# Patient Record
Sex: FEMALE | Race: BLACK OR AFRICAN AMERICAN | NOT HISPANIC OR LATINO | URBAN - METROPOLITAN AREA
[De-identification: names, ages, dates, MRNs, and addresses within clinical notes are randomized per-mention and may not be internally consistent; named-entity substitution may affect disease eponyms.]

---

## 2020-06-01 ENCOUNTER — OFFICE VISIT (OUTPATIENT)
Dept: URBAN - METROPOLITAN AREA TELEHEALTH 2 | Facility: TELEHEALTH | Age: 80
End: 2020-06-01
Payer: COMMERCIAL

## 2020-06-01 DIAGNOSIS — K62.5 RECTAL BLEEDING: ICD-10-CM

## 2020-06-01 DIAGNOSIS — K76.89 HEPATIC CYST: ICD-10-CM

## 2020-06-01 DIAGNOSIS — R10.13 EPIGASTRIC ABDOMINAL PAIN: ICD-10-CM

## 2020-06-01 PROCEDURE — 99442 PHONE E/M BY PHYS 11-20 MIN: CPT | Performed by: INTERNAL MEDICINE

## 2020-06-01 RX ORDER — ONDANSETRON HCL 8 MG
TAKE 1 TABLET BY ORAL ROUTE 3 TIMES A DAY FOR 30 DAYS TABLET ORAL
Qty: 90 | Refills: 3 | Status: ACTIVE | COMMUNITY
Start: 2020-04-27 | End: 2020-08-25

## 2020-06-01 RX ORDER — SUCRALFATE 1 G/1
TAKE 1 TABLET(1 GRAM) BY MOUTH FOUR TIMES DAILY 1 HOUR BEFORE MEALS AND AT BEDTIME ON AN EMPTY STOMACH TABLET ORAL
Qty: 360 | Refills: 3 | Status: ACTIVE | COMMUNITY
Start: 2020-04-28 | End: 1900-01-01

## 2020-06-01 RX ORDER — OMEPRAZOLE 40 MG/1
TAKE 1 CAPSULE (40 MG) BY ORAL ROUTE 2 TIMES PER DAY BEFORE A MEAL FOR 90 DAYS CAPSULE, DELAYED RELEASE PELLETS ORAL 2
Qty: 180 | Refills: 3 | Status: ACTIVE | COMMUNITY
Start: 2020-04-27 | End: 2021-04-22

## 2020-06-01 RX ORDER — HYDROCORTISONE 25 MG/G
APPLY A THIN LAYER TO THE AFFECTED AREA(S) BY TOPICAL ROUTE 2 TIMES PER DAY FOR 14 DAYS CREAM TOPICAL 2
Qty: 2 | Refills: 1 | Status: ACTIVE | COMMUNITY
Start: 2020-05-07 | End: 2020-06-04

## 2020-06-01 RX ORDER — TRAVOPROST 0.04 MG/ML
SOLUTION/ DROPS OPHTHALMIC
Qty: 0 | Refills: 0 | Status: ACTIVE | COMMUNITY
Start: 1900-01-01 | End: 1900-01-01

## 2020-06-01 NOTE — HPI-TODAY'S VISIT:
Ms. Kinsey returns for follow-up of post-prandial abdominal pain and nausea.  Telephone call is used in lieu of video chat as patient does not have access to this technology.  Since her last clinic visit, she underwent EGD and colonoscopy.  EGD showed no H. pylori but some gastritis.  Colonoscopy with biopsies was normal as well.  She has been on BID PPI and sucralfate.  She is feeling well now.  No further nausea or pain.  She did not get her CT scan as she has been feeling better.  We also follow hepatic cysts which have been stable since 2009.  She is without complaints today.

## 2020-12-02 ENCOUNTER — OFFICE VISIT (OUTPATIENT)
Dept: URBAN - NONMETROPOLITAN AREA CLINIC 2 | Facility: CLINIC | Age: 80
End: 2020-12-02

## 2021-04-01 ENCOUNTER — OFFICE VISIT (OUTPATIENT)
Dept: URBAN - NONMETROPOLITAN AREA CLINIC 2 | Facility: CLINIC | Age: 81
End: 2021-04-01

## 2021-04-15 ENCOUNTER — TELEPHONE ENCOUNTER (OUTPATIENT)
Dept: URBAN - METROPOLITAN AREA CLINIC 98 | Facility: CLINIC | Age: 81
End: 2021-04-15

## 2021-04-15 ENCOUNTER — OFFICE VISIT (OUTPATIENT)
Dept: URBAN - NONMETROPOLITAN AREA CLINIC 2 | Facility: CLINIC | Age: 81
End: 2021-04-15
Payer: COMMERCIAL

## 2021-04-15 DIAGNOSIS — Z86.19 HISTORY OF HELICOBACTER INFECTION: ICD-10-CM

## 2021-04-15 DIAGNOSIS — R93.2 ABNORMAL CT SCAN, GALLBLADDER: ICD-10-CM

## 2021-04-15 DIAGNOSIS — Z80.0 FAMILY HISTORY OF COLON CANCER: ICD-10-CM

## 2021-04-15 DIAGNOSIS — K31.89 INTESTINAL METAPLASIA OF GASTRIC MUCOSA: ICD-10-CM

## 2021-04-15 DIAGNOSIS — Z86.010 HISTORY OF ADENOMATOUS POLYP OF COLON: ICD-10-CM

## 2021-04-15 DIAGNOSIS — K57.90 DIVERTICULOSIS: ICD-10-CM

## 2021-04-15 DIAGNOSIS — K76.89 HEPATIC CYST: ICD-10-CM

## 2021-04-15 DIAGNOSIS — K21.9 GASTROESOPHAGEAL REFLUX DISEASE, UNSPECIFIED WHETHER ESOPHAGITIS PRESENT: ICD-10-CM

## 2021-04-15 PROCEDURE — 99214 OFFICE O/P EST MOD 30 MIN: CPT | Performed by: INTERNAL MEDICINE

## 2021-04-15 RX ORDER — TRAVOPROST 0.04 MG/ML
SOLUTION/ DROPS OPHTHALMIC
Qty: 0 | Refills: 0 | Status: ACTIVE | COMMUNITY
Start: 1900-01-01

## 2021-04-15 RX ORDER — OMEPRAZOLE 20 MG/1
1 CAPSULE 30 MINUTES BEFORE MORNING MEAL CAPSULE, DELAYED RELEASE ORAL ONCE A DAY
Qty: 90 | Refills: 3 | OUTPATIENT
Start: 2021-04-19

## 2021-04-15 RX ORDER — HYDROCORTISONE 25 MG/G
1 APPLICATION CREAM TOPICAL TWICE A DAY
Qty: 1 | Refills: 1 | OUTPATIENT
Start: 2021-04-19 | End: 2021-06-18

## 2021-04-15 RX ORDER — SUCRALFATE 1 G/1
TAKE 1 TABLET(1 GRAM) BY MOUTH FOUR TIMES DAILY 1 HOUR BEFORE MEALS AND AT BEDTIME ON AN EMPTY STOMACH TABLET ORAL
Qty: 360 | Refills: 3 | Status: DISCONTINUED | COMMUNITY
Start: 2020-04-28

## 2021-04-15 RX ORDER — OMEPRAZOLE 40 MG/1
TAKE 1 CAPSULE (40 MG) BY ORAL ROUTE 2 TIMES PER DAY BEFORE A MEAL FOR 90 DAYS CAPSULE, DELAYED RELEASE PELLETS ORAL 2
Qty: 180 | Refills: 3 | Status: ACTIVE | COMMUNITY
Start: 2020-04-27 | End: 2021-04-22

## 2021-04-15 NOTE — HPI-TODAY'S VISIT:
79 yo F retired from  for a [employer]  3 kids. 2 local. former pt of dr michele last ov 4/27/2020 w dr person. colby f/u w a female MD om omeprazole 40mg qd w good control of gerd having 2-3 bms/d

## 2021-04-15 NOTE — PHYSICAL EXAM GASTROINTESTINAL
Abdomen, soft, nontender, nondistended, no guarding or rigidity, no masses palpable, normal bowel sounds

## 2021-04-22 LAB
ANTIPARIETAL CELL ANTIBODY: 28.7
FERRITIN, SERUM: 39
INTRINSIC FACTOR ABS, SERUM: 0.9
IRON BIND.CAP.(TIBC): 333
IRON SATURATION: 14
IRON: 46
MAGNESIUM: 2.1
UIBC: 287
VITAMIN B12: 499

## 2021-09-08 ENCOUNTER — TELEPHONE ENCOUNTER (OUTPATIENT)
Dept: URBAN - NONMETROPOLITAN AREA CLINIC 2 | Facility: CLINIC | Age: 81
End: 2021-09-08

## 2021-09-08 RX ORDER — HYDROCORTISONE 2.5% 25 MG/G
1 APPLICATION CREAM TOPICAL TWICE A DAY
Qty: 1 | Refills: 1 | OUTPATIENT
Start: 2021-09-10 | End: 2021-11-08

## 2021-09-22 ENCOUNTER — TELEPHONE ENCOUNTER (OUTPATIENT)
Dept: URBAN - METROPOLITAN AREA CLINIC 92 | Facility: CLINIC | Age: 81
End: 2021-09-22

## 2021-09-22 ENCOUNTER — WEB ENCOUNTER (OUTPATIENT)
Dept: URBAN - NONMETROPOLITAN AREA CLINIC 2 | Facility: CLINIC | Age: 81
End: 2021-09-22

## 2021-09-22 ENCOUNTER — OFFICE VISIT (OUTPATIENT)
Dept: URBAN - NONMETROPOLITAN AREA CLINIC 2 | Facility: CLINIC | Age: 81
End: 2021-09-22
Payer: COMMERCIAL

## 2021-09-22 VITALS
HEIGHT: 67 IN | WEIGHT: 210 LBS | HEART RATE: 76 BPM | SYSTOLIC BLOOD PRESSURE: 162 MMHG | DIASTOLIC BLOOD PRESSURE: 77 MMHG | BODY MASS INDEX: 32.96 KG/M2

## 2021-09-22 DIAGNOSIS — K21.9 GASTROESOPHAGEAL REFLUX DISEASE, UNSPECIFIED WHETHER ESOPHAGITIS PRESENT: ICD-10-CM

## 2021-09-22 DIAGNOSIS — K64.9 HEMORRHOIDS, UNSPECIFIED HEMORRHOID TYPE: ICD-10-CM

## 2021-09-22 DIAGNOSIS — Z86.19 HISTORY OF HELICOBACTER INFECTION: ICD-10-CM

## 2021-09-22 DIAGNOSIS — K57.90 DIVERTICULOSIS: ICD-10-CM

## 2021-09-22 DIAGNOSIS — K80.20 GALLSTONES: ICD-10-CM

## 2021-09-22 DIAGNOSIS — K76.89 HEPATIC CYST: ICD-10-CM

## 2021-09-22 DIAGNOSIS — Z80.0 FAMILY HISTORY OF COLON CANCER: ICD-10-CM

## 2021-09-22 DIAGNOSIS — D51.0 PERNICIOUS ANEMIA: ICD-10-CM

## 2021-09-22 DIAGNOSIS — Z86.010 HISTORY OF ADENOMATOUS POLYP OF COLON: ICD-10-CM

## 2021-09-22 DIAGNOSIS — K31.89 INTESTINAL METAPLASIA OF GASTRIC MUCOSA: ICD-10-CM

## 2021-09-22 PROBLEM — 72519002: Status: ACTIVE | Noted: 2021-04-18

## 2021-09-22 PROBLEM — 312824007: Status: ACTIVE | Noted: 2021-04-18

## 2021-09-22 PROBLEM — 70153002: Status: ACTIVE | Noted: 2021-09-22

## 2021-09-22 PROBLEM — 119981000119101: Status: ACTIVE | Noted: 2021-04-18

## 2021-09-22 PROCEDURE — 99214 OFFICE O/P EST MOD 30 MIN: CPT | Performed by: INTERNAL MEDICINE

## 2021-09-22 RX ORDER — OMEPRAZOLE 20 MG/1
1 CAPSULE 30 MINUTES BEFORE MORNING MEAL CAPSULE, DELAYED RELEASE ORAL ONCE A DAY
Qty: 90 | Refills: 3 | OUTPATIENT
Start: 2021-09-22

## 2021-09-22 RX ORDER — HYDROCORTISONE 2.5% 25 MG/G
1 APPLICATION CREAM TOPICAL TWICE A DAY
Qty: 1 | Refills: 1 | Status: ACTIVE | COMMUNITY
Start: 2021-09-10 | End: 2021-11-08

## 2021-09-22 RX ORDER — HYDROCORTISONE 2.5% 25 MG/G
1 APPLICATION CREAM TOPICAL TWICE A DAY
Qty: 1 | Refills: 2 | OUTPATIENT
Start: 2021-09-22 | End: 2021-12-20

## 2021-09-22 RX ORDER — TRAVOPROST 0.04 MG/ML
SOLUTION/ DROPS OPHTHALMIC
Qty: 0 | Refills: 0 | Status: ACTIVE | COMMUNITY
Start: 1900-01-01

## 2021-09-22 RX ORDER — OMEPRAZOLE 20 MG/1
1 CAPSULE 30 MINUTES BEFORE MORNING MEAL CAPSULE, DELAYED RELEASE ORAL ONCE A DAY
Qty: 90 | Refills: 3 | Status: ACTIVE | COMMUNITY
Start: 2021-04-19

## 2021-09-22 NOTE — HPI-TODAY'S VISIT:
81 yo F retired from  for a [employer]  3 kids. 2 local. former pt of dr michele last ov 4/15/2021 was omeprazole 40mg qd w good control of gerd but, rarely feels gerd dec to 20mg and still wo c/os still having 2-3 bms/d does have probs w hemorrhoids occ likes the proctozone. occ bld. does not want to pursue RBL at this point labs done in response to the notation of gastric IM revealed prob PA. now on B12 inj qmo and oral qwk

## 2021-09-23 ENCOUNTER — OFFICE VISIT (OUTPATIENT)
Dept: URBAN - NONMETROPOLITAN AREA CLINIC 2 | Facility: CLINIC | Age: 81
End: 2021-09-23

## 2021-12-15 ENCOUNTER — TELEPHONE ENCOUNTER (OUTPATIENT)
Dept: URBAN - METROPOLITAN AREA CLINIC 92 | Facility: CLINIC | Age: 81
End: 2021-12-15

## 2021-12-15 RX ORDER — OMEPRAZOLE 20 MG/1
1 CAPSULE 30 MINUTES BEFORE MORNING MEAL CAPSULE, DELAYED RELEASE ORAL ONCE A DAY
Qty: 90 | Refills: 1 | OUTPATIENT
Start: 2021-12-15

## 2022-01-24 ENCOUNTER — TELEPHONE ENCOUNTER (OUTPATIENT)
Dept: URBAN - NONMETROPOLITAN AREA CLINIC 2 | Facility: CLINIC | Age: 82
End: 2022-01-24

## 2022-01-24 RX ORDER — OMEPRAZOLE 20 MG/1
1 CAPSULE 30 MINUTES BEFORE MORNING MEAL CAPSULE, DELAYED RELEASE ORAL ONCE A DAY
Qty: 90 | Refills: 1 | OUTPATIENT
Start: 2022-01-25

## 2022-02-04 ENCOUNTER — TELEPHONE ENCOUNTER (OUTPATIENT)
Dept: URBAN - NONMETROPOLITAN AREA CLINIC 2 | Facility: CLINIC | Age: 82
End: 2022-02-04

## 2022-02-04 RX ORDER — OMEPRAZOLE 20 MG/1
1 CAPSULE 30 MINUTES BEFORE MORNING MEAL CAPSULE, DELAYED RELEASE ORAL ONCE A DAY
Qty: 90 | Refills: 0
Start: 2021-12-15

## 2022-02-07 ENCOUNTER — TELEPHONE ENCOUNTER (OUTPATIENT)
Dept: URBAN - METROPOLITAN AREA CLINIC 92 | Facility: CLINIC | Age: 82
End: 2022-02-07

## 2022-02-07 RX ORDER — HYDROCORTISONE 2.5% 25 MG/G
1 APPLICATION CREAM TOPICAL TWICE A DAY
Qty: 1 | Refills: 3 | OUTPATIENT
Start: 2022-02-07 | End: 2022-06-07

## 2022-05-04 ENCOUNTER — OFFICE VISIT (OUTPATIENT)
Dept: URBAN - NONMETROPOLITAN AREA CLINIC 2 | Facility: CLINIC | Age: 82
End: 2022-05-04
Payer: COMMERCIAL

## 2022-05-04 ENCOUNTER — TELEPHONE ENCOUNTER (OUTPATIENT)
Dept: URBAN - METROPOLITAN AREA CLINIC 92 | Facility: CLINIC | Age: 82
End: 2022-05-04

## 2022-05-04 VITALS
WEIGHT: 222.5 LBS | DIASTOLIC BLOOD PRESSURE: 67 MMHG | BODY MASS INDEX: 34.92 KG/M2 | TEMPERATURE: 97 F | SYSTOLIC BLOOD PRESSURE: 141 MMHG | HEIGHT: 67 IN | HEART RATE: 80 BPM

## 2022-05-04 DIAGNOSIS — K31.89 INTESTINAL METAPLASIA OF GASTRIC MUCOSA: ICD-10-CM

## 2022-05-04 DIAGNOSIS — Z86.010 HISTORY OF ADENOMATOUS POLYP OF COLON: ICD-10-CM

## 2022-05-04 DIAGNOSIS — K64.9 HEMORRHOIDS, UNSPECIFIED HEMORRHOID TYPE: ICD-10-CM

## 2022-05-04 DIAGNOSIS — Z80.0 FAMILY HISTORY OF COLON CANCER: ICD-10-CM

## 2022-05-04 DIAGNOSIS — K21.9 GASTROESOPHAGEAL REFLUX DISEASE, UNSPECIFIED WHETHER ESOPHAGITIS PRESENT: ICD-10-CM

## 2022-05-04 DIAGNOSIS — K80.20 GALLSTONES: ICD-10-CM

## 2022-05-04 DIAGNOSIS — K76.89 HEPATIC CYST: ICD-10-CM

## 2022-05-04 DIAGNOSIS — K57.90 DIVERTICULOSIS: ICD-10-CM

## 2022-05-04 DIAGNOSIS — D51.0 PERNICIOUS ANEMIA: ICD-10-CM

## 2022-05-04 DIAGNOSIS — Z86.19 HISTORY OF HELICOBACTER INFECTION: ICD-10-CM

## 2022-05-04 PROBLEM — 397881000: Status: ACTIVE | Noted: 2021-04-18

## 2022-05-04 PROBLEM — 84027009: Status: ACTIVE | Noted: 2021-09-22

## 2022-05-04 PROBLEM — 429047008: Status: ACTIVE | Noted: 2021-04-18

## 2022-05-04 PROCEDURE — 99214 OFFICE O/P EST MOD 30 MIN: CPT | Performed by: INTERNAL MEDICINE

## 2022-05-04 RX ORDER — OMEPRAZOLE 20 MG/1
1 CAPSULE 30 MINUTES BEFORE MORNING MEAL CAPSULE, DELAYED RELEASE ORAL ONCE A DAY
Qty: 90 | Refills: 3 | OUTPATIENT
Start: 2022-05-04

## 2022-05-04 RX ORDER — OMEPRAZOLE 20 MG/1
1 CAPSULE 30 MINUTES BEFORE MORNING MEAL CAPSULE, DELAYED RELEASE ORAL ONCE A DAY
Qty: 90 | Refills: 3 | Status: ACTIVE | COMMUNITY
Start: 2021-04-19

## 2022-05-04 RX ORDER — TRAVOPROST 0.04 MG/ML
SOLUTION/ DROPS OPHTHALMIC
Qty: 0 | Refills: 0 | Status: ACTIVE | COMMUNITY
Start: 1900-01-01

## 2022-05-04 RX ORDER — HYDROCORTISONE 2.5% 25 MG/G
1 APPLICATION CREAM TOPICAL TWICE A DAY
Qty: 1 | Refills: 3 | Status: ACTIVE | COMMUNITY
Start: 2022-02-07 | End: 2022-06-07

## 2022-05-04 RX ORDER — HYDROCORTISONE 2.5% 25 MG/G
1 APPLICATION CREAM TOPICAL TWICE A DAY
Qty: 1 | Refills: 2 | OUTPATIENT
Start: 2022-05-04 | End: 2022-08-02

## 2022-05-04 NOTE — HPI-TODAY'S VISIT:
80 yo F retired from  for a [employer]  3 kids. 2 local. former pt of dr michele last ov 9/22/2021 was on omeprazole 40mg qd w good control of gerd but, rarely feels gerd. dec to 20mg and still wo c/os still having 2-3 bms/d does have probs w hemorrhoids occ. likes the proctozone. occ bld. still does not want to pursue RBL at this point labs done in response to the notation of gastric IM revealed prob PA. now on B12 inj qmo and oral qwk

## 2022-05-05 LAB
FERRITIN, SERUM: 39
IRON BIND.CAP.(TIBC): 308
IRON SATURATION: 13
IRON: 40
MAGNESIUM: 2.3
UIBC: 268
VITAMIN B12: 567

## 2022-06-12 ENCOUNTER — TELEPHONE ENCOUNTER (OUTPATIENT)
Dept: URBAN - METROPOLITAN AREA CLINIC 92 | Facility: CLINIC | Age: 82
End: 2022-06-12

## 2022-07-15 ENCOUNTER — OFFICE VISIT (OUTPATIENT)
Dept: URBAN - NONMETROPOLITAN AREA SURGERY CENTER 1 | Facility: SURGERY CENTER | Age: 82
End: 2022-07-15
Payer: COMMERCIAL

## 2022-07-15 DIAGNOSIS — K31.89 ACQUIRED DEFORMITY OF DUODENUM: ICD-10-CM

## 2022-07-15 DIAGNOSIS — K22.89 DILATATION OF ESOPHAGUS: ICD-10-CM

## 2022-07-15 PROCEDURE — 43239 EGD BIOPSY SINGLE/MULTIPLE: CPT | Performed by: INTERNAL MEDICINE

## 2022-07-15 PROCEDURE — G8907 PT DOC NO EVENTS ON DISCHARG: HCPCS | Performed by: INTERNAL MEDICINE

## 2022-07-15 RX ORDER — OMEPRAZOLE 20 MG/1
1 CAPSULE 30 MINUTES BEFORE MORNING MEAL CAPSULE, DELAYED RELEASE ORAL ONCE A DAY
Qty: 90 | Refills: 3 | Status: ACTIVE | COMMUNITY
Start: 2022-05-04

## 2022-07-15 RX ORDER — HYDROCORTISONE 2.5% 25 MG/G
1 APPLICATION CREAM TOPICAL TWICE A DAY
Qty: 1 | Refills: 2 | Status: ACTIVE | COMMUNITY
Start: 2022-05-04 | End: 2022-08-02

## 2022-07-15 RX ORDER — TRAVOPROST 0.04 MG/ML
SOLUTION/ DROPS OPHTHALMIC
Qty: 0 | Refills: 0 | Status: ACTIVE | COMMUNITY
Start: 1900-01-01

## 2022-07-15 RX ORDER — OMEPRAZOLE 20 MG/1
1 CAPSULE 30 MINUTES BEFORE MORNING MEAL CAPSULE, DELAYED RELEASE ORAL ONCE A DAY
Qty: 90 | Refills: 3 | Status: ACTIVE | COMMUNITY
Start: 2021-04-19

## 2022-08-03 ENCOUNTER — TELEPHONE ENCOUNTER (OUTPATIENT)
Dept: URBAN - METROPOLITAN AREA CLINIC 23 | Facility: CLINIC | Age: 82
End: 2022-08-03

## 2023-05-03 ENCOUNTER — OFFICE VISIT (OUTPATIENT)
Dept: URBAN - NONMETROPOLITAN AREA CLINIC 2 | Facility: CLINIC | Age: 83
End: 2023-05-03
Payer: COMMERCIAL

## 2023-05-03 VITALS
BODY MASS INDEX: 34.12 KG/M2 | SYSTOLIC BLOOD PRESSURE: 152 MMHG | HEIGHT: 67 IN | WEIGHT: 217.4 LBS | DIASTOLIC BLOOD PRESSURE: 77 MMHG | HEART RATE: 76 BPM

## 2023-05-03 DIAGNOSIS — K57.90 DIVERTICULOSIS: ICD-10-CM

## 2023-05-03 DIAGNOSIS — K31.89 INTESTINAL METAPLASIA OF GASTRIC MUCOSA: ICD-10-CM

## 2023-05-03 DIAGNOSIS — K80.20 GALLSTONES: ICD-10-CM

## 2023-05-03 DIAGNOSIS — N28.9 RENAL LESION: ICD-10-CM

## 2023-05-03 DIAGNOSIS — D51.0 PERNICIOUS ANEMIA: ICD-10-CM

## 2023-05-03 DIAGNOSIS — K21.9 GASTROESOPHAGEAL REFLUX DISEASE, UNSPECIFIED WHETHER ESOPHAGITIS PRESENT: ICD-10-CM

## 2023-05-03 DIAGNOSIS — Z86.010 HISTORY OF ADENOMATOUS POLYP OF COLON: ICD-10-CM

## 2023-05-03 DIAGNOSIS — K64.9 HEMORRHOIDS, UNSPECIFIED HEMORRHOID TYPE: ICD-10-CM

## 2023-05-03 DIAGNOSIS — K76.89 HEPATIC CYST: ICD-10-CM

## 2023-05-03 DIAGNOSIS — Z80.0 FAMILY HISTORY OF COLON CANCER: ICD-10-CM

## 2023-05-03 DIAGNOSIS — Z86.19 HISTORY OF HELICOBACTER INFECTION: ICD-10-CM

## 2023-05-03 PROCEDURE — 99214 OFFICE O/P EST MOD 30 MIN: CPT | Performed by: INTERNAL MEDICINE

## 2023-05-03 RX ORDER — TRAVOPROST 0.04 MG/ML
SOLUTION/ DROPS OPHTHALMIC
Qty: 0 | Refills: 0 | Status: ACTIVE | COMMUNITY
Start: 1900-01-01

## 2023-05-03 RX ORDER — FEXOFENADINE HYDROCHLORIDE 180 MG/1
1 TABLET SWALLOW WHOLE WITH WATER; DO NOT TAKE WITH FRUIT JUICES TABLET ORAL ONCE A DAY
Status: ACTIVE | COMMUNITY

## 2023-05-03 RX ORDER — LOSARTAN POTASSIUM 100 MG/1
1 TABLET TABLET ORAL ONCE A DAY
Status: ACTIVE | COMMUNITY

## 2023-05-03 RX ORDER — OMEPRAZOLE 20 MG/1
1 CAPSULE 30 MINUTES BEFORE MORNING MEAL CAPSULE, DELAYED RELEASE ORAL ONCE A DAY
Qty: 90 | Refills: 3 | Status: ACTIVE | COMMUNITY
Start: 2021-04-19

## 2023-05-03 RX ORDER — ROSUVASTATIN CALCIUM 5 MG/1
1 TABLET TABLET, FILM COATED ORAL ONCE A DAY
Status: ACTIVE | COMMUNITY

## 2023-05-03 NOTE — HPI-TODAY'S VISIT:
81 yo F retired from  for a [employer]  lives alone. 3 kids. 2 local. former pt of dr michele last ov 5/4/2022 was on omeprazole 40mg qd w good control of gerd. but, rarely feels gerd. dec to 20mg and still wo c/os still having 2-3 bms/d does have probs w hemorrhoids occ. likes the proctozone. occ bld. still does not want to pursue RBL at this point labs done in response to the notation of gastric IM revealed prob PA. now on B12 inj qmo and oral qwk

## 2023-05-07 ENCOUNTER — LAB OUTSIDE AN ENCOUNTER (OUTPATIENT)
Dept: URBAN - NONMETROPOLITAN AREA CLINIC 2 | Facility: CLINIC | Age: 83
End: 2023-05-07

## 2023-05-07 ENCOUNTER — TELEPHONE ENCOUNTER (OUTPATIENT)
Dept: URBAN - METROPOLITAN AREA CLINIC 35 | Facility: CLINIC | Age: 83
End: 2023-05-07

## 2023-05-07 PROBLEM — 79131000119100: Status: ACTIVE | Noted: 2022-06-12

## 2023-05-07 PROBLEM — 235595009: Status: ACTIVE | Noted: 2021-04-18

## 2023-05-07 RX ORDER — OMEPRAZOLE 20 MG/1
1 CAPSULE 30 MINUTES BEFORE MORNING MEAL CAPSULE, DELAYED RELEASE ORAL ONCE A DAY
Qty: 90 | Refills: 3 | OUTPATIENT
Start: 2023-05-07

## 2023-05-11 ENCOUNTER — TELEPHONE ENCOUNTER (OUTPATIENT)
Dept: URBAN - NONMETROPOLITAN AREA CLINIC 2 | Facility: CLINIC | Age: 83
End: 2023-05-11

## 2023-05-11 RX ORDER — OMEPRAZOLE 20 MG/1
1 CAPSULE 30 MINUTES BEFORE MORNING MEAL CAPSULE, DELAYED RELEASE ORAL ONCE A DAY
Qty: 90 | Refills: 3 | OUTPATIENT
Start: 2023-05-11

## 2023-06-02 ENCOUNTER — TELEPHONE ENCOUNTER (OUTPATIENT)
Dept: URBAN - NONMETROPOLITAN AREA CLINIC 2 | Facility: CLINIC | Age: 83
End: 2023-06-02

## 2023-08-16 ENCOUNTER — OFFICE VISIT (OUTPATIENT)
Dept: URBAN - NONMETROPOLITAN AREA CLINIC 2 | Facility: CLINIC | Age: 83
End: 2023-08-16
Payer: COMMERCIAL

## 2023-08-16 ENCOUNTER — TELEPHONE ENCOUNTER (OUTPATIENT)
Dept: URBAN - METROPOLITAN AREA CLINIC 96 | Facility: CLINIC | Age: 83
End: 2023-08-16

## 2023-08-16 VITALS
DIASTOLIC BLOOD PRESSURE: 67 MMHG | BODY MASS INDEX: 33.9 KG/M2 | SYSTOLIC BLOOD PRESSURE: 146 MMHG | WEIGHT: 216 LBS | HEIGHT: 67 IN | HEART RATE: 73 BPM | OXYGEN SATURATION: 97 %

## 2023-08-16 DIAGNOSIS — D50.9 MICROCYTIC ANEMIA: ICD-10-CM

## 2023-08-16 DIAGNOSIS — Z86.010 HISTORY OF ADENOMATOUS POLYP OF COLON: ICD-10-CM

## 2023-08-16 DIAGNOSIS — K76.89 HEPATIC CYST: ICD-10-CM

## 2023-08-16 DIAGNOSIS — K21.9 GASTROESOPHAGEAL REFLUX DISEASE, UNSPECIFIED WHETHER ESOPHAGITIS PRESENT: ICD-10-CM

## 2023-08-16 PROCEDURE — 99214 OFFICE O/P EST MOD 30 MIN: CPT | Performed by: INTERNAL MEDICINE

## 2023-08-16 RX ORDER — LOSARTAN POTASSIUM 100 MG/1
1 TABLET TABLET ORAL ONCE A DAY
Status: ACTIVE | COMMUNITY

## 2023-08-16 RX ORDER — HYDROCORTISONE 25 MG/G
1 APPLICATION CREAM TOPICAL TWICE A DAY
Qty: 1 | Refills: 2 | OUTPATIENT
Start: 2023-08-16 | End: 2023-11-13

## 2023-08-16 RX ORDER — METFORMIN HYDROCHLORIDE 500 MG/1
TABLET ORAL
Qty: 180 TABLET | Status: ACTIVE | COMMUNITY

## 2023-08-16 RX ORDER — FEXOFENADINE HYDROCHLORIDE 180 MG/1
1 TABLET SWALLOW WHOLE WITH WATER; DO NOT TAKE WITH FRUIT JUICES TABLET ORAL ONCE A DAY
Status: ACTIVE | COMMUNITY

## 2023-08-16 RX ORDER — OMEPRAZOLE 20 MG/1
1 CAPSULE 30 MINUTES BEFORE MORNING MEAL CAPSULE, DELAYED RELEASE ORAL ONCE A DAY
Qty: 90 | Refills: 3 | OUTPATIENT
Start: 2023-08-16

## 2023-08-16 RX ORDER — TRAVOPROST 0.04 MG/ML
SOLUTION/ DROPS OPHTHALMIC
Qty: 0 | Refills: 0 | Status: ACTIVE | COMMUNITY
Start: 1900-01-01

## 2023-08-16 RX ORDER — ROSUVASTATIN CALCIUM 5 MG/1
1 TABLET TABLET, FILM COATED ORAL ONCE A DAY
Status: ACTIVE | COMMUNITY

## 2023-08-16 RX ORDER — OMEPRAZOLE 20 MG/1
1 CAPSULE 30 MINUTES BEFORE MORNING MEAL CAPSULE, DELAYED RELEASE ORAL ONCE A DAY
Qty: 90 | Refills: 3 | Status: ACTIVE | COMMUNITY
Start: 2023-05-07

## 2023-08-16 NOTE — HPI-TODAY'S VISIT:
83 yo F retired from  for a [employer]  lives alone. 3 kids. 2 local. former pt of dr michele last ov 5/3/2023 was on omeprazole 40mg qd w good control of gerd. but, rarely feels gerd. dec to 20mg and still wo c/os still having 2-3 bms/d does have probs w hemorrhoids occ. likes the proctozone. occ bld. still does not want to pursue RBL at this point labs done in response to the notation of gastric IM revealed prob PA. now on B12 inj qmo and oral qwk had an US ordered at 5/3/2023 for a  hx of hepatic cyst which showed benign cysts and a 2.2 cm stone in the GB neck. referred to a GS. but, developed acute cholecystitis prompting a LC 6/1/2023. labs then also showed a microcytic anemia and ALT/AST 56/96.

## 2023-08-20 ENCOUNTER — DASHBOARD ENCOUNTERS (OUTPATIENT)
Age: 83
End: 2023-08-20

## 2023-08-20 PROBLEM — 234349007: Status: ACTIVE | Noted: 2023-08-20

## 2023-08-20 PROBLEM — 235919008: Status: ACTIVE | Noted: 2021-09-22

## 2023-08-20 PROBLEM — 85057007: Status: ACTIVE | Noted: 2021-04-18

## 2023-08-24 LAB
A/G RATIO: 1.3
ABSOLUTE BASOPHILS: 40
ABSOLUTE EOSINOPHILS: 92
ABSOLUTE LYMPHOCYTES: 1551
ABSOLUTE MONOCYTES: 455
ABSOLUTE NEUTROPHILS: 4462
ALBUMIN: 3.8
ALKALINE PHOSPHATASE: 82
ALT (SGPT): 6
AST (SGOT): 10
BASOPHILS: 0.6
BILIRUBIN, TOTAL: 0.4
BUN/CREATININE RATIO: (no result)
BUN: 8
CALCIUM: 9.1
CARBON DIOXIDE, TOTAL: 27
CHLORIDE: 104
CREATININE: 0.86
EGFR: 68
EOSINOPHILS: 1.4
FERRITIN, SERUM: 10
GLOBULIN, TOTAL: 3
GLUCOSE: 103
HEMATOCRIT: 35.1
HEMOGLOBIN: 11.4
IRON BIND.CAP.(TIBC): 373
IRON SATURATION: 14
IRON: 51
LYMPHOCYTES: 23.5
MCH: 25.9
MCHC: 32.5
MCV: 79.6
MONOCYTES: 6.9
MPV: 10.6
NEUTROPHILS: 67.6
PLATELET COUNT: 255
POTASSIUM: 4.2
PROTEIN, TOTAL: 6.8
RDW: 14.8
RED BLOOD CELL COUNT: 4.41
SODIUM: 142
WHITE BLOOD CELL COUNT: 6.6

## 2023-09-05 ENCOUNTER — TELEPHONE ENCOUNTER (OUTPATIENT)
Dept: URBAN - NONMETROPOLITAN AREA CLINIC 2 | Facility: CLINIC | Age: 83
End: 2023-09-05

## 2023-09-05 RX ORDER — FERROUS GLUCONATE 324 MG
1 TABLET WITH WATER OR JUICE BETWEEN MEALS TABLET ORAL ONCE A DAY
Qty: 90 TABLET | Refills: 0 | OUTPATIENT
Start: 2023-09-10

## 2023-09-10 PROBLEM — 87522002: Status: ACTIVE | Noted: 2023-09-10

## 2023-09-20 ENCOUNTER — P2P PATIENT RECORD (OUTPATIENT)
Age: 83
End: 2023-09-20

## 2023-10-31 LAB
ABSOLUTE BASOPHILS: 33
ABSOLUTE EOSINOPHILS: 119
ABSOLUTE LYMPHOCYTES: 1307
ABSOLUTE MONOCYTES: 462
ABSOLUTE NEUTROPHILS: 4679
BASOPHILS: 0.5
EOSINOPHILS: 1.8
FERRITIN, SERUM: 10
HEMATOCRIT: 36.1
HEMOGLOBIN: 11.1
IRON BIND.CAP.(TIBC): 363
IRON SATURATION: 14
IRON: 51
LYMPHOCYTES: 19.8
MCH: 23.8
MCHC: 30.7
MCV: 77.5
MONOCYTES: 7
MPV: 10.9
NEUTROPHILS: 70.9
PLATELET COUNT: 272
RDW: 16
RED BLOOD CELL COUNT: 4.66
WHITE BLOOD CELL COUNT: 6.6

## 2024-01-25 ENCOUNTER — P2P PATIENT RECORD (OUTPATIENT)
Age: 84
End: 2024-01-25

## 2024-05-01 ENCOUNTER — OFFICE VISIT (OUTPATIENT)
Dept: URBAN - NONMETROPOLITAN AREA CLINIC 2 | Facility: CLINIC | Age: 84
End: 2024-05-01

## 2024-05-14 ENCOUNTER — TELEPHONE ENCOUNTER (OUTPATIENT)
Dept: URBAN - NONMETROPOLITAN AREA CLINIC 2 | Facility: CLINIC | Age: 84
End: 2024-05-14

## 2024-05-14 RX ORDER — OMEPRAZOLE 20 MG/1
1 CAPSULE 30 MINUTES BEFORE MORNING MEAL CAPSULE, DELAYED RELEASE ORAL ONCE A DAY
Qty: 90 | Refills: 3
Start: 2023-08-16

## 2024-05-29 ENCOUNTER — CLAIMS CREATED FROM THE CLAIM WINDOW (OUTPATIENT)
Dept: URBAN - NONMETROPOLITAN AREA CLINIC 2 | Facility: CLINIC | Age: 84
End: 2024-05-29

## 2024-05-29 ENCOUNTER — TELEPHONE ENCOUNTER (OUTPATIENT)
Dept: URBAN - METROPOLITAN AREA CLINIC 96 | Facility: CLINIC | Age: 84
End: 2024-05-29

## 2024-05-29 ENCOUNTER — LAB OUTSIDE AN ENCOUNTER (OUTPATIENT)
Dept: URBAN - NONMETROPOLITAN AREA CLINIC 2 | Facility: CLINIC | Age: 84
End: 2024-05-29

## 2024-05-29 ENCOUNTER — OFFICE VISIT (OUTPATIENT)
Dept: URBAN - NONMETROPOLITAN AREA CLINIC 2 | Facility: CLINIC | Age: 84
End: 2024-05-29
Payer: COMMERCIAL

## 2024-05-29 VITALS
WEIGHT: 225 LBS | HEIGHT: 67 IN | BODY MASS INDEX: 35.31 KG/M2 | DIASTOLIC BLOOD PRESSURE: 71 MMHG | HEART RATE: 93 BPM | SYSTOLIC BLOOD PRESSURE: 151 MMHG

## 2024-05-29 DIAGNOSIS — Z86.19 HISTORY OF HELICOBACTER INFECTION: ICD-10-CM

## 2024-05-29 DIAGNOSIS — K64.9 HEMORRHOIDS, UNSPECIFIED HEMORRHOID TYPE: ICD-10-CM

## 2024-05-29 DIAGNOSIS — D50.9 IRON DEFICIENCY ANEMIA, UNSPECIFIED IRON DEFICIENCY ANEMIA TYPE: ICD-10-CM

## 2024-05-29 DIAGNOSIS — K80.20 GALLSTONES: ICD-10-CM

## 2024-05-29 DIAGNOSIS — D50.9 MICROCYTIC ANEMIA: ICD-10-CM

## 2024-05-29 DIAGNOSIS — K21.9 GASTROESOPHAGEAL REFLUX DISEASE, UNSPECIFIED WHETHER ESOPHAGITIS PRESENT: ICD-10-CM

## 2024-05-29 DIAGNOSIS — K31.89 INTESTINAL METAPLASIA OF GASTRIC MUCOSA: ICD-10-CM

## 2024-05-29 DIAGNOSIS — N28.9 RENAL LESION: ICD-10-CM

## 2024-05-29 DIAGNOSIS — K57.90 DIVERTICULOSIS: ICD-10-CM

## 2024-05-29 DIAGNOSIS — Z86.010 HISTORY OF ADENOMATOUS POLYP OF COLON: ICD-10-CM

## 2024-05-29 DIAGNOSIS — K76.89 HEPATIC CYST: ICD-10-CM

## 2024-05-29 DIAGNOSIS — D51.0 PERNICIOUS ANEMIA: ICD-10-CM

## 2024-05-29 DIAGNOSIS — Z80.0 FAMILY HISTORY OF COLON CANCER: ICD-10-CM

## 2024-05-29 PROCEDURE — 99214 OFFICE O/P EST MOD 30 MIN: CPT | Performed by: INTERNAL MEDICINE

## 2024-05-29 RX ORDER — FEXOFENADINE HYDROCHLORIDE 180 MG/1
1 TABLET SWALLOW WHOLE WITH WATER; DO NOT TAKE WITH FRUIT JUICES TABLET ORAL ONCE A DAY
Status: ACTIVE | COMMUNITY

## 2024-05-29 RX ORDER — LOSARTAN POTASSIUM 100 MG/1
TABLET, FILM COATED ORAL
Qty: 90 TABLET | Status: ACTIVE | COMMUNITY

## 2024-05-29 RX ORDER — TRAVOPROST 0.04 MG/ML
SOLUTION/ DROPS OPHTHALMIC
Qty: 0 | Refills: 0 | Status: ACTIVE | COMMUNITY
Start: 1900-01-01

## 2024-05-29 RX ORDER — HYDROCHLOROTHIAZIDE 25 MG/1
TAKE 1 TABLET BY MOUTH ONCE DAILY TABLET ORAL
Qty: 90 EACH | Refills: 2 | Status: ACTIVE | COMMUNITY

## 2024-05-29 RX ORDER — FEXOFENADINE HCL 180 MG/1
TAKE 1 TABLET BY MOUTH ONCE DAILY IN THE MORNING TABLET ORAL
Qty: 90 EACH | Refills: 0 | Status: ACTIVE | COMMUNITY

## 2024-05-29 RX ORDER — HYDROCORTISONE 25 MG/G
APPLY TOPICALLY TO AFFECTED AREA TWICE DAILY CREAM TOPICAL
Qty: 28 GRAM | Refills: 0 | Status: ACTIVE | COMMUNITY

## 2024-05-29 RX ORDER — AMLODIPINE BESYLATE 5 MG/1
TABLET ORAL
Qty: 90 TABLET | Status: ACTIVE | COMMUNITY

## 2024-05-29 RX ORDER — ERGOCALCIFEROL CAPSULES, 1.25 MG/1
TAKE 1 CAPSULE BY MOUTH ONCE A WEEK CAPSULE ORAL
Qty: 12 EACH | Refills: 0 | Status: ACTIVE | COMMUNITY

## 2024-05-29 RX ORDER — OMEPRAZOLE 20 MG/1
1 CAPSULE 30 MINUTES BEFORE MORNING MEAL CAPSULE, DELAYED RELEASE ORAL ONCE A DAY
Qty: 90 | Refills: 3 | OUTPATIENT
Start: 2024-05-29

## 2024-05-29 RX ORDER — FERROUS SULFATE TAB 325 MG (65 MG ELEMENTAL FE) 325 (65 FE) MG
TAKE 1 TABLET BY MOUTH ONCE DAILY MAY INCREASE TO THREE TIMES A DAY IF TOLERATED TAB ORAL
Qty: 90 EACH | Refills: 0 | Status: ACTIVE | COMMUNITY

## 2024-05-29 RX ORDER — METFORMIN HYDROCHLORIDE 500 MG/1
TABLET ORAL
Qty: 180 TABLET | Status: ACTIVE | COMMUNITY

## 2024-05-29 RX ORDER — OMEPRAZOLE 20 MG/1
CAPSULE, DELAYED RELEASE ORAL
Qty: 90 CAPSULE | Status: ACTIVE | COMMUNITY

## 2024-05-29 RX ORDER — ROSUVASTATIN CALCIUM 5 MG/1
TABLET, FILM COATED ORAL
Qty: 90 TABLET | Status: ACTIVE | COMMUNITY

## 2024-08-14 ENCOUNTER — OFFICE VISIT (OUTPATIENT)
Dept: URBAN - NONMETROPOLITAN AREA CLINIC 2 | Facility: CLINIC | Age: 84
End: 2024-08-14

## 2024-10-15 ENCOUNTER — P2P PATIENT RECORD (OUTPATIENT)
Age: 84
End: 2024-10-15

## 2024-10-30 ENCOUNTER — TELEPHONE ENCOUNTER (OUTPATIENT)
Dept: URBAN - NONMETROPOLITAN AREA CLINIC 2 | Facility: CLINIC | Age: 84
End: 2024-10-30

## 2024-11-06 ENCOUNTER — OFFICE VISIT (OUTPATIENT)
Dept: URBAN - NONMETROPOLITAN AREA CLINIC 2 | Facility: CLINIC | Age: 84
End: 2024-11-06
Payer: COMMERCIAL

## 2024-11-06 VITALS
WEIGHT: 226 LBS | HEART RATE: 81 BPM | BODY MASS INDEX: 35.47 KG/M2 | HEIGHT: 67 IN | DIASTOLIC BLOOD PRESSURE: 71 MMHG | OXYGEN SATURATION: 97 % | SYSTOLIC BLOOD PRESSURE: 151 MMHG

## 2024-11-06 DIAGNOSIS — K31.89 INTESTINAL METAPLASIA OF GASTRIC MUCOSA: ICD-10-CM

## 2024-11-06 DIAGNOSIS — K64.9 HEMORRHOIDS, UNSPECIFIED HEMORRHOID TYPE: ICD-10-CM

## 2024-11-06 DIAGNOSIS — Z80.0 FAMILY HISTORY OF COLON CANCER: ICD-10-CM

## 2024-11-06 DIAGNOSIS — K80.20 GALLSTONES: ICD-10-CM

## 2024-11-06 DIAGNOSIS — Z86.19 HISTORY OF HELICOBACTER INFECTION: ICD-10-CM

## 2024-11-06 DIAGNOSIS — Z09 ENCOUNTER FOR FOLLOW-UP: ICD-10-CM

## 2024-11-06 DIAGNOSIS — D50.9 IRON DEFICIENCY ANEMIA, UNSPECIFIED IRON DEFICIENCY ANEMIA TYPE: ICD-10-CM

## 2024-11-06 DIAGNOSIS — K57.30 ACQUIRED DIVERTICULOSIS OF COLON: ICD-10-CM

## 2024-11-06 DIAGNOSIS — K76.89 HEPATIC CYST: ICD-10-CM

## 2024-11-06 DIAGNOSIS — K21.9 GASTROESOPHAGEAL REFLUX DISEASE, UNSPECIFIED WHETHER ESOPHAGITIS PRESENT: ICD-10-CM

## 2024-11-06 DIAGNOSIS — N28.9 RENAL LESION: ICD-10-CM

## 2024-11-06 DIAGNOSIS — Z86.0100 HISTORY OF COLON POLYPS: ICD-10-CM

## 2024-11-06 PROCEDURE — 99214 OFFICE O/P EST MOD 30 MIN: CPT | Performed by: INTERNAL MEDICINE

## 2024-11-06 RX ORDER — TRAVOPROST 0.04 MG/ML
SOLUTION/ DROPS OPHTHALMIC
Qty: 0 | Refills: 0 | Status: ACTIVE | COMMUNITY
Start: 1900-01-01

## 2024-11-06 RX ORDER — ROSUVASTATIN CALCIUM 5 MG/1
TABLET, FILM COATED ORAL
Qty: 90 TABLET | Status: ACTIVE | COMMUNITY

## 2024-11-06 RX ORDER — LOSARTAN POTASSIUM 100 MG/1
TABLET, FILM COATED ORAL
Qty: 90 TABLET | Status: ACTIVE | COMMUNITY

## 2024-11-06 RX ORDER — METFORMIN HYDROCHLORIDE 500 MG/1
TABLET ORAL
Qty: 180 TABLET | Status: ACTIVE | COMMUNITY

## 2024-11-06 RX ORDER — FERROUS SULFATE TAB 325 MG (65 MG ELEMENTAL FE) 325 (65 FE) MG
1 TABLET TAB ORAL THREE TIMES A DAY
Qty: 90 TABLET | Refills: 0 | Status: ACTIVE | COMMUNITY

## 2024-11-06 RX ORDER — FEXOFENADINE HCL 180 MG/1
TAKE 1 TABLET BY MOUTH ONCE DAILY IN THE MORNING TABLET ORAL
Qty: 90 EACH | Refills: 0 | Status: ACTIVE | COMMUNITY

## 2024-11-06 RX ORDER — HYDROCHLOROTHIAZIDE 25 MG/1
TAKE 1 TABLET BY MOUTH ONCE DAILY TABLET ORAL
Qty: 90 EACH | Refills: 2 | Status: ACTIVE | COMMUNITY

## 2024-11-06 RX ORDER — ERGOCALCIFEROL CAPSULES, 1.25 MG/1
TAKE 1 CAPSULE BY MOUTH ONCE A WEEK CAPSULE ORAL
Qty: 12 EACH | Refills: 0 | Status: ACTIVE | COMMUNITY

## 2024-11-06 RX ORDER — HYDROCORTISONE 25 MG/G
APPLY TOPICALLY TO AFFECTED AREA TWICE DAILY CREAM TOPICAL
Qty: 28 GRAM | Refills: 0 | Status: ACTIVE | COMMUNITY

## 2024-11-06 RX ORDER — OMEPRAZOLE 20 MG/1
CAPSULE, DELAYED RELEASE ORAL
Qty: 90 CAPSULE | Status: ACTIVE | COMMUNITY

## 2024-11-06 RX ORDER — AMLODIPINE BESYLATE 5 MG/1
TABLET ORAL
Qty: 90 TABLET | Status: ACTIVE | COMMUNITY

## 2024-11-06 RX ORDER — OMEPRAZOLE 20 MG/1
1 CAPSULE 30 MINUTES BEFORE MORNING MEAL CAPSULE, DELAYED RELEASE ORAL ONCE A DAY
Qty: 90 | Refills: 3 | Status: ACTIVE | COMMUNITY
Start: 2024-05-29

## 2024-11-06 RX ORDER — FEXOFENADINE HYDROCHLORIDE 180 MG/1
1 TABLET SWALLOW WHOLE WITH WATER; DO NOT TAKE WITH FRUIT JUICES TABLET ORAL ONCE A DAY
Status: ACTIVE | COMMUNITY

## 2024-11-06 NOTE — HPI-TODAY'S VISIT:
84 yo F retired from  for a [employer]  lives alone. 3 kids. 2 local. former pt of dr michele last ov 8/16/2023 was on omeprazole 40mg qd w good control of gerd. but, rarely feels gerd. dec to 20mg and still wo c/os still having 2-3 bms/d does have probs w hemorrhoids occ. likes the proctozone. occ bld. still does not want to pursue RBL at this point labs done in response to the notation of gastric IM revealed prob PA. now on B12 inj qmo and oral qwk had an US ordered at 5/3/2023 for a  hx of hepatic cyst which showed benign cysts and a 2.2 cm stone in the GB neck. referred to a GS. but, developed acute cholecystitis prompting a LC 6/1/2023. labs then also showed a microcytic anemia and ALT/AST 56/96. now seeing dr santacruz re her ABIGAIL felt 2/2  malabsorption. on po iron.

## 2024-12-05 ENCOUNTER — OFFICE VISIT (OUTPATIENT)
Dept: URBAN - NONMETROPOLITAN AREA CLINIC 2 | Facility: CLINIC | Age: 84
End: 2024-12-05
Payer: COMMERCIAL

## 2024-12-05 DIAGNOSIS — Z80.0 FAMILY HISTORY OF MALIGNANT NEOPLASM OF COLON IN FIRST DEGREE RELATIVE DIAGNOSED WHEN YOUNGER THAN 60 YEARS OF AGE: ICD-10-CM

## 2024-12-05 DIAGNOSIS — K64.1 GRADE II HEMORRHOIDS: ICD-10-CM

## 2024-12-05 PROCEDURE — 46221 LIGATION OF HEMORRHOID(S): CPT | Performed by: INTERNAL MEDICINE

## 2024-12-05 RX ORDER — FEXOFENADINE HYDROCHLORIDE 180 MG/1
1 TABLET SWALLOW WHOLE WITH WATER; DO NOT TAKE WITH FRUIT JUICES TABLET ORAL ONCE A DAY
Status: ACTIVE | COMMUNITY

## 2024-12-05 RX ORDER — HYDROCORTISONE 25 MG/G
APPLY TOPICALLY TO AFFECTED AREA TWICE DAILY CREAM TOPICAL
Qty: 28 GRAM | Refills: 0 | Status: ACTIVE | COMMUNITY

## 2024-12-05 RX ORDER — ROSUVASTATIN CALCIUM 5 MG/1
TABLET, FILM COATED ORAL
Qty: 90 TABLET | Status: ACTIVE | COMMUNITY

## 2024-12-05 RX ORDER — SODIUM, POTASSIUM,MAG SULFATES 17.5-3.13G
AS DIRECTED SOLUTION, RECONSTITUTED, ORAL ORAL
Qty: 1 | Refills: 0 | OUTPATIENT
Start: 2024-12-05 | End: 2024-12-07

## 2024-12-05 RX ORDER — AMLODIPINE BESYLATE 5 MG/1
TABLET ORAL
Qty: 90 TABLET | Status: ACTIVE | COMMUNITY

## 2024-12-05 RX ORDER — FEXOFENADINE HCL 180 MG/1
TAKE 1 TABLET BY MOUTH ONCE DAILY IN THE MORNING TABLET ORAL
Qty: 90 EACH | Refills: 0 | Status: ACTIVE | COMMUNITY

## 2024-12-05 RX ORDER — ERGOCALCIFEROL CAPSULES, 1.25 MG/1
TAKE 1 CAPSULE BY MOUTH ONCE A WEEK CAPSULE ORAL
Qty: 12 EACH | Refills: 0 | Status: ACTIVE | COMMUNITY

## 2024-12-05 RX ORDER — METFORMIN HYDROCHLORIDE 500 MG/1
TABLET ORAL
Qty: 180 TABLET | Status: ACTIVE | COMMUNITY

## 2024-12-05 RX ORDER — HYDROCHLOROTHIAZIDE 25 MG/1
TAKE 1 TABLET BY MOUTH ONCE DAILY TABLET ORAL
Qty: 90 EACH | Refills: 2 | Status: ACTIVE | COMMUNITY

## 2024-12-05 RX ORDER — LOSARTAN POTASSIUM 100 MG/1
TABLET, FILM COATED ORAL
Qty: 90 TABLET | Status: ACTIVE | COMMUNITY

## 2024-12-05 RX ORDER — OMEPRAZOLE 20 MG/1
1 CAPSULE 30 MINUTES BEFORE MORNING MEAL CAPSULE, DELAYED RELEASE ORAL ONCE A DAY
Qty: 90 | Refills: 3 | Status: ACTIVE | COMMUNITY
Start: 2024-05-29

## 2024-12-05 RX ORDER — OMEPRAZOLE 20 MG/1
CAPSULE, DELAYED RELEASE ORAL
Qty: 90 CAPSULE | Status: ACTIVE | COMMUNITY

## 2024-12-05 RX ORDER — TRAVOPROST 0.04 MG/ML
SOLUTION/ DROPS OPHTHALMIC
Qty: 0 | Refills: 0 | Status: ACTIVE | COMMUNITY
Start: 1900-01-01

## 2024-12-05 RX ORDER — FERROUS SULFATE TAB 325 MG (65 MG ELEMENTAL FE) 325 (65 FE) MG
1 TABLET TAB ORAL THREE TIMES A DAY
Qty: 90 TABLET | Refills: 0 | Status: ACTIVE | COMMUNITY

## 2024-12-05 NOTE — HPI-TODAY'S VISIT:
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 former pt of dr michele last ov 8/16/2023 was on omeprazole 40mg qd w good control of gerd. but, rarely feels gerd. dec to 20mg and still wo c/os still having 2-3 bms/d does have probs w hemorrhoids occ. likes the proctozone. occ bld. still does not want to pursue RBL at this point labs done in response to the notation of gastric IM revealed prob PA. now on B12 inj qmo and oral qwk had an US ordered at 5/3/2023 for a  hx of hepatic cyst which showed benign cysts and a 2.2 cm stone in the GB neck. referred to a GS. but, developed acute cholecystitis prompting a LC 6/1/2023. labs then also showed a microcytic anemia and ALT/AST 56/96.   now seeing dr santacruz re her ABIGAIL felt 2/2 malabsorption. on po iron.  12/5/24: Ms. Kinsey returns to GI clinic for follow-up of rectal bleeding.  Bleeding has stopped.  She wonders about hemorrhoids.  She did have a sister with colon cancer and asks about repeating this.  She is interested in hemorrhoid banding if she is able.

## 2025-01-21 ENCOUNTER — OFFICE VISIT (OUTPATIENT)
Dept: URBAN - NONMETROPOLITAN AREA CLINIC 2 | Facility: CLINIC | Age: 85
End: 2025-01-21
Payer: COMMERCIAL

## 2025-01-21 DIAGNOSIS — K64.1 GRADE II HEMORRHOIDS: ICD-10-CM

## 2025-01-21 PROCEDURE — 46221 LIGATION OF HEMORRHOID(S): CPT | Performed by: INTERNAL MEDICINE

## 2025-01-21 RX ORDER — OMEPRAZOLE 20 MG/1
1 CAPSULE 30 MINUTES BEFORE MORNING MEAL CAPSULE, DELAYED RELEASE ORAL ONCE A DAY
Qty: 90 | Refills: 3 | Status: ACTIVE | COMMUNITY
Start: 2024-05-29

## 2025-01-21 RX ORDER — LOSARTAN POTASSIUM 100 MG/1
TABLET, FILM COATED ORAL
Qty: 90 TABLET | Status: ACTIVE | COMMUNITY

## 2025-01-21 RX ORDER — ERGOCALCIFEROL CAPSULES, 1.25 MG/1
TAKE 1 CAPSULE BY MOUTH ONCE A WEEK CAPSULE ORAL
Qty: 12 EACH | Refills: 0 | Status: ACTIVE | COMMUNITY

## 2025-01-21 RX ORDER — METFORMIN HYDROCHLORIDE 500 MG/1
TABLET ORAL
Qty: 180 TABLET | Status: ACTIVE | COMMUNITY

## 2025-01-21 RX ORDER — FEXOFENADINE HYDROCHLORIDE 180 MG/1
1 TABLET SWALLOW WHOLE WITH WATER; DO NOT TAKE WITH FRUIT JUICES TABLET ORAL ONCE A DAY
Status: ACTIVE | COMMUNITY

## 2025-01-21 RX ORDER — TRAVOPROST 0.04 MG/ML
SOLUTION/ DROPS OPHTHALMIC
Qty: 0 | Refills: 0 | Status: ACTIVE | COMMUNITY
Start: 1900-01-01

## 2025-01-21 RX ORDER — ROSUVASTATIN CALCIUM 5 MG/1
TABLET, FILM COATED ORAL
Qty: 90 TABLET | Status: ACTIVE | COMMUNITY

## 2025-01-21 RX ORDER — FEXOFENADINE HCL 180 MG/1
TAKE 1 TABLET BY MOUTH ONCE DAILY IN THE MORNING TABLET ORAL
Qty: 90 EACH | Refills: 0 | Status: ACTIVE | COMMUNITY

## 2025-01-21 RX ORDER — HYDROCHLOROTHIAZIDE 25 MG/1
TAKE 1 TABLET BY MOUTH ONCE DAILY TABLET ORAL
Qty: 90 EACH | Refills: 2 | Status: ACTIVE | COMMUNITY

## 2025-01-21 RX ORDER — OMEPRAZOLE 20 MG/1
CAPSULE, DELAYED RELEASE ORAL
Qty: 90 CAPSULE | Status: ACTIVE | COMMUNITY

## 2025-01-21 RX ORDER — FERROUS SULFATE TAB 325 MG (65 MG ELEMENTAL FE) 325 (65 FE) MG
1 TABLET TAB ORAL THREE TIMES A DAY
Qty: 90 TABLET | Refills: 0 | Status: ACTIVE | COMMUNITY

## 2025-01-21 RX ORDER — AMLODIPINE BESYLATE 5 MG/1
TABLET ORAL
Qty: 90 TABLET | Status: ACTIVE | COMMUNITY

## 2025-01-21 RX ORDER — HYDROCORTISONE 25 MG/G
APPLY TOPICALLY TO AFFECTED AREA TWICE DAILY CREAM TOPICAL
Qty: 28 GRAM | Refills: 0 | Status: ACTIVE | COMMUNITY

## 2025-01-21 NOTE — HPI-TODAY'S VISIT:
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  former pt of dr michele last ov 8/16/2023.  was on omeprazole 40mg qd w good control of gerd. but, rarely feels gerd.  dec to 20mg and still wo c/os still having 2-3 bms/d does have probs w hemorrhoids occ. likes the proctozone. occ bld. still does not want to pursue RBL at this point labs done in response to the notation of gastric IM revealed prob PA. now on B12 inj qmo and oral qwk had an US ordered at 5/3/2023 for a  hx of hepatic cyst which showed benign cysts and a 2.2 cm stone in the GB neck. referred to a GS. but, developed acute cholecystitis prompting a LC 6/1/2023. labs then also showed a microcytic anemia and ALT/AST 56/96.  now seeing dr neeta mejia her ABIGAIL felt 2/2 malabsorption. on po iron.  12/5/24: Ms. Kinsey returns to GI clinic for follow-up of rectal bleeding.  Bleeding has stopped.  She wonders about hemorrhoids.  She did have a sister with colon cancer and asks about repeating this.  She is interested in hemorrhoid banding if she is able.  1/21/25: Ms. Kinsey returns for follow-up of hemorrhoid bleeding.  She continues to have rectal bleeding frequently from her hemorrhoids.  No constipation or straining.

## 2025-02-17 ENCOUNTER — TELEPHONE ENCOUNTER (OUTPATIENT)
Dept: URBAN - NONMETROPOLITAN AREA CLINIC 2 | Facility: CLINIC | Age: 85
End: 2025-02-17

## 2025-02-17 RX ORDER — SODIUM, POTASSIUM,MAG SULFATES 17.5-3.13G
177ML SOLUTION, RECONSTITUTED, ORAL ORAL
Qty: 1 | Refills: 0 | OUTPATIENT
Start: 2025-02-17 | End: 2025-02-19

## 2025-02-18 ENCOUNTER — P2P PATIENT RECORD (OUTPATIENT)
Age: 85
End: 2025-02-18

## 2025-02-18 ENCOUNTER — OFFICE VISIT (OUTPATIENT)
Dept: URBAN - NONMETROPOLITAN AREA CLINIC 2 | Facility: CLINIC | Age: 85
End: 2025-02-18
Payer: COMMERCIAL

## 2025-02-18 ENCOUNTER — TELEPHONE ENCOUNTER (OUTPATIENT)
Dept: URBAN - NONMETROPOLITAN AREA CLINIC 2 | Facility: CLINIC | Age: 85
End: 2025-02-18

## 2025-02-18 VITALS
HEART RATE: 77 BPM | SYSTOLIC BLOOD PRESSURE: 134 MMHG | HEIGHT: 67 IN | DIASTOLIC BLOOD PRESSURE: 76 MMHG | BODY MASS INDEX: 35.47 KG/M2 | WEIGHT: 226 LBS

## 2025-02-18 DIAGNOSIS — K64.8 INTERNAL HEMORRHOIDS: ICD-10-CM

## 2025-02-18 PROCEDURE — 99212 OFFICE O/P EST SF 10 MIN: CPT | Performed by: INTERNAL MEDICINE

## 2025-02-18 RX ORDER — FEXOFENADINE HYDROCHLORIDE 180 MG/1
1 TABLET SWALLOW WHOLE WITH WATER; DO NOT TAKE WITH FRUIT JUICES TABLET ORAL ONCE A DAY
Status: ACTIVE | COMMUNITY

## 2025-02-18 RX ORDER — HYDROCHLOROTHIAZIDE 25 MG/1
TAKE 1 TABLET BY MOUTH ONCE DAILY TABLET ORAL
Qty: 90 EACH | Refills: 2 | Status: ACTIVE | COMMUNITY

## 2025-02-18 RX ORDER — TRAVOPROST 0.04 MG/ML
SOLUTION/ DROPS OPHTHALMIC
Qty: 0 | Refills: 0 | Status: ACTIVE | COMMUNITY
Start: 1900-01-01

## 2025-02-18 RX ORDER — FERROUS SULFATE TAB 325 MG (65 MG ELEMENTAL FE) 325 (65 FE) MG
1 TABLET TAB ORAL THREE TIMES A DAY
Qty: 90 TABLET | Refills: 0 | Status: ACTIVE | COMMUNITY

## 2025-02-18 RX ORDER — ERGOCALCIFEROL CAPSULES, 1.25 MG/1
TAKE 1 CAPSULE BY MOUTH ONCE A WEEK CAPSULE ORAL
Qty: 12 EACH | Refills: 0 | Status: ACTIVE | COMMUNITY

## 2025-02-18 RX ORDER — HYDROCORTISONE 25 MG/G
APPLY TOPICALLY TO AFFECTED AREA TWICE DAILY CREAM TOPICAL
Qty: 28 GRAM | Refills: 0 | Status: ACTIVE | COMMUNITY

## 2025-02-18 RX ORDER — OMEPRAZOLE 20 MG/1
1 CAPSULE 30 MINUTES BEFORE MORNING MEAL CAPSULE, DELAYED RELEASE ORAL ONCE A DAY
Qty: 90 | Refills: 3 | Status: ACTIVE | COMMUNITY
Start: 2024-05-29

## 2025-02-18 RX ORDER — METFORMIN HYDROCHLORIDE 500 MG/1
TABLET ORAL
Qty: 180 TABLET | Status: ACTIVE | COMMUNITY

## 2025-02-18 RX ORDER — AMLODIPINE BESYLATE 5 MG/1
TABLET ORAL
Qty: 90 TABLET | Status: ACTIVE | COMMUNITY

## 2025-02-18 RX ORDER — OMEPRAZOLE 20 MG/1
CAPSULE, DELAYED RELEASE ORAL
Qty: 90 CAPSULE | Status: ACTIVE | COMMUNITY

## 2025-02-18 RX ORDER — SODIUM, POTASSIUM,MAG SULFATES 17.5-3.13G
177ML SOLUTION, RECONSTITUTED, ORAL ORAL
Qty: 1 | Refills: 0 | Status: ACTIVE | COMMUNITY
Start: 2025-02-17 | End: 2025-02-19

## 2025-02-18 RX ORDER — FEXOFENADINE HCL 180 MG/1
TAKE 1 TABLET BY MOUTH ONCE DAILY IN THE MORNING TABLET ORAL
Qty: 90 EACH | Refills: 0 | Status: ACTIVE | COMMUNITY

## 2025-02-18 RX ORDER — ROSUVASTATIN CALCIUM 5 MG/1
TABLET, FILM COATED ORAL
Qty: 90 TABLET | Status: ACTIVE | COMMUNITY

## 2025-02-18 RX ORDER — LOSARTAN POTASSIUM 100 MG/1
TABLET, FILM COATED ORAL
Qty: 90 TABLET | Status: ACTIVE | COMMUNITY

## 2025-02-18 NOTE — HPI-TODAY'S VISIT:
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  former pt of dr michele last ov 8/16/2023.  was on omeprazole 40mg qd w good control of gerd. but, rarely feels gerd.  dec to 20mg and still wo c/os still having 2-3 bms/d does have probs w hemorrhoids occ. likes the proctozone. occ bld. still does not want to pursue RBL at this point labs done in response to the notation of gastric IM revealed prob PA. now on B12 inj qmo and oral qwk had an US ordered at 5/3/2023 for a  hx of hepatic cyst which showed benign cysts and a 2.2 cm stone in the GB neck. referred to a GS. but, developed acute cholecystitis prompting a LC 6/1/2023. labs then also showed a microcytic anemia and ALT/AST 56/96.  now seeing dr neeta mejia her ABIGAIL felt 2/2 malabsorption. on po iron.  12/5/24: Ms. Kinsey returns to GI clinic for follow-up of rectal bleeding.  Bleeding has stopped.  She wonders about hemorrhoids.  She did have a sister with colon cancer and asks about repeating this.  She is interested in hemorrhoid banding if she is able.  1/21/25: Ms. Kinsey returns for follow-up of hemorrhoid bleeding.  She continues to have rectal bleeding frequently from her hemorrhoids.  No constipation or straining.  2/18/25: Ms. Kinsey returns  for follow-up of hemorrhoid bleeding.  Bleeding has improved a lot.  She had two episodes right after her banding but none in the past few weeks.  She is planning her colonoscopy March 3rd.

## 2025-02-18 NOTE — PHYSICAL EXAM NECK/THYROID:
normal appearance , without tenderness upon palpation , no deformities , trachea midline , Thyroid normal size , no masses , thyroid nontender
done

## 2025-03-03 ENCOUNTER — TELEPHONE ENCOUNTER (OUTPATIENT)
Dept: URBAN - NONMETROPOLITAN AREA CLINIC 2 | Facility: CLINIC | Age: 85
End: 2025-03-03

## 2025-03-03 ENCOUNTER — CLAIMS CREATED FROM THE CLAIM WINDOW (OUTPATIENT)
Dept: URBAN - NONMETROPOLITAN AREA SURGERY CENTER 1 | Facility: SURGERY CENTER | Age: 85
End: 2025-03-03
Payer: COMMERCIAL

## 2025-03-03 ENCOUNTER — CLAIMS CREATED FROM THE CLAIM WINDOW (OUTPATIENT)
Dept: URBAN - METROPOLITAN AREA CLINIC 4 | Facility: CLINIC | Age: 85
End: 2025-03-03
Payer: COMMERCIAL

## 2025-03-03 DIAGNOSIS — Z80.0 FAMILY HISTORY OF COLON CANCER: ICD-10-CM

## 2025-03-03 DIAGNOSIS — D12.2 BENIGN NEOPLASM OF ASCENDING COLON: ICD-10-CM

## 2025-03-03 DIAGNOSIS — Z80.0 BROTHER AT YOUNG AGE FAMILY HISTORY OF COLON CANCER: ICD-10-CM

## 2025-03-03 DIAGNOSIS — D12.2 ADENOMA OF ASCENDING COLON: ICD-10-CM

## 2025-03-03 DIAGNOSIS — K57.30 DIVERTICULOSIS OF SIGMOID COLON: ICD-10-CM

## 2025-03-03 DIAGNOSIS — Z12.11 COLON CANCER SCREENING: ICD-10-CM

## 2025-03-03 DIAGNOSIS — Z12.11 ENCOUNTER SCREENING FOR MALIGNANT NEOPLASM OF COLON: ICD-10-CM

## 2025-03-03 DIAGNOSIS — K64.8 OTHER HEMORRHOIDS: ICD-10-CM

## 2025-03-03 PROCEDURE — 45385 COLONOSCOPY W/LESION REMOVAL: CPT | Performed by: CLINIC/CENTER

## 2025-03-03 PROCEDURE — 88305 TISSUE EXAM BY PATHOLOGIST: CPT | Performed by: PATHOLOGY

## 2025-03-03 PROCEDURE — 00811 ANES LWR INTST NDSC NOS: CPT | Performed by: NURSE ANESTHETIST, CERTIFIED REGISTERED

## 2025-03-03 PROCEDURE — 45385 COLONOSCOPY W/LESION REMOVAL: CPT | Performed by: INTERNAL MEDICINE

## 2025-03-03 RX ORDER — HYDROCORTISONE 25 MG/G
APPLY TOPICALLY TO AFFECTED AREA TWICE DAILY CREAM TOPICAL
Qty: 28 GRAM | Refills: 0 | Status: ACTIVE | COMMUNITY

## 2025-03-03 RX ORDER — FEXOFENADINE HCL 180 MG/1
TAKE 1 TABLET BY MOUTH ONCE DAILY IN THE MORNING TABLET ORAL
Qty: 90 EACH | Refills: 0 | Status: ACTIVE | COMMUNITY

## 2025-03-03 RX ORDER — TRAVOPROST 0.04 MG/ML
SOLUTION/ DROPS OPHTHALMIC
Qty: 0 | Refills: 0 | Status: ACTIVE | COMMUNITY
Start: 1900-01-01

## 2025-03-03 RX ORDER — METFORMIN HYDROCHLORIDE 500 MG/1
TABLET ORAL
Qty: 180 TABLET | Status: ACTIVE | COMMUNITY

## 2025-03-03 RX ORDER — ROSUVASTATIN CALCIUM 5 MG/1
TABLET, FILM COATED ORAL
Qty: 90 TABLET | Status: ACTIVE | COMMUNITY

## 2025-03-03 RX ORDER — HYDROCHLOROTHIAZIDE 25 MG/1
TAKE 1 TABLET BY MOUTH ONCE DAILY TABLET ORAL
Qty: 90 EACH | Refills: 2 | Status: ACTIVE | COMMUNITY

## 2025-03-03 RX ORDER — AMLODIPINE BESYLATE 5 MG/1
TABLET ORAL
Qty: 90 TABLET | Status: ACTIVE | COMMUNITY

## 2025-03-03 RX ORDER — OMEPRAZOLE 20 MG/1
CAPSULE, DELAYED RELEASE ORAL
Qty: 90 CAPSULE | Status: ACTIVE | COMMUNITY

## 2025-03-03 RX ORDER — ERGOCALCIFEROL CAPSULES, 1.25 MG/1
TAKE 1 CAPSULE BY MOUTH ONCE A WEEK CAPSULE ORAL
Qty: 12 EACH | Refills: 0 | Status: ACTIVE | COMMUNITY

## 2025-03-03 RX ORDER — OMEPRAZOLE 20 MG/1
1 CAPSULE 30 MINUTES BEFORE MORNING MEAL CAPSULE, DELAYED RELEASE ORAL ONCE A DAY
Qty: 90 | Refills: 3 | Status: ACTIVE | COMMUNITY
Start: 2024-05-29

## 2025-03-03 RX ORDER — FERROUS SULFATE TAB 325 MG (65 MG ELEMENTAL FE) 325 (65 FE) MG
1 TABLET TAB ORAL THREE TIMES A DAY
Qty: 90 TABLET | Refills: 0 | Status: ACTIVE | COMMUNITY

## 2025-03-03 RX ORDER — FEXOFENADINE HYDROCHLORIDE 180 MG/1
1 TABLET SWALLOW WHOLE WITH WATER; DO NOT TAKE WITH FRUIT JUICES TABLET ORAL ONCE A DAY
Status: ACTIVE | COMMUNITY

## 2025-03-03 RX ORDER — LOSARTAN POTASSIUM 100 MG/1
TABLET, FILM COATED ORAL
Qty: 90 TABLET | Status: ACTIVE | COMMUNITY

## 2025-03-19 ENCOUNTER — OFFICE VISIT (OUTPATIENT)
Dept: URBAN - NONMETROPOLITAN AREA CLINIC 2 | Facility: CLINIC | Age: 85
End: 2025-03-19

## 2025-06-20 ENCOUNTER — OFFICE VISIT (OUTPATIENT)
Dept: URBAN - NONMETROPOLITAN AREA CLINIC 2 | Facility: CLINIC | Age: 85
End: 2025-06-20
Payer: COMMERCIAL

## 2025-06-20 DIAGNOSIS — D50.9 CHRONIC IRON DEFICIENCY ANEMIA: ICD-10-CM

## 2025-06-20 DIAGNOSIS — K64.8 INTERNAL HEMORRHOIDS: ICD-10-CM

## 2025-06-20 PROBLEM — 87522002: Status: ACTIVE | Noted: 2025-06-20

## 2025-06-20 PROCEDURE — 99214 OFFICE O/P EST MOD 30 MIN: CPT | Performed by: INTERNAL MEDICINE

## 2025-06-20 RX ORDER — FEXOFENADINE HYDROCHLORIDE 180 MG/1
1 TABLET SWALLOW WHOLE WITH WATER; DO NOT TAKE WITH FRUIT JUICES TABLET ORAL ONCE A DAY
Status: ACTIVE | COMMUNITY

## 2025-06-20 RX ORDER — OMEPRAZOLE 20 MG/1
1 CAPSULE 30 MINUTES BEFORE MORNING MEAL CAPSULE, DELAYED RELEASE ORAL ONCE A DAY
Qty: 90 | Refills: 3 | Status: ACTIVE | COMMUNITY
Start: 2024-05-29

## 2025-06-20 RX ORDER — TRAVOPROST 0.04 MG/ML
SOLUTION/ DROPS OPHTHALMIC
Qty: 0 | Refills: 0 | Status: ACTIVE | COMMUNITY
Start: 1900-01-01

## 2025-06-20 RX ORDER — ERGOCALCIFEROL CAPSULES, 1.25 MG/1
TAKE 1 CAPSULE BY MOUTH ONCE A WEEK CAPSULE ORAL
Qty: 12 EACH | Refills: 0 | Status: ACTIVE | COMMUNITY

## 2025-06-20 RX ORDER — METFORMIN HYDROCHLORIDE 500 MG/1
TABLET ORAL
Qty: 180 TABLET | Status: ACTIVE | COMMUNITY

## 2025-06-20 RX ORDER — ROSUVASTATIN CALCIUM 5 MG/1
TABLET, FILM COATED ORAL
Qty: 90 TABLET | Status: ACTIVE | COMMUNITY

## 2025-06-20 RX ORDER — FEXOFENADINE HCL 180 MG/1
TAKE 1 TABLET BY MOUTH ONCE DAILY IN THE MORNING TABLET ORAL
Qty: 90 EACH | Refills: 0 | Status: ACTIVE | COMMUNITY

## 2025-06-20 RX ORDER — FERROUS SULFATE TAB 325 MG (65 MG ELEMENTAL FE) 325 (65 FE) MG
1 TABLET TAB ORAL THREE TIMES A DAY
Qty: 90 TABLET | Refills: 0 | Status: ACTIVE | COMMUNITY

## 2025-06-20 RX ORDER — AMLODIPINE BESYLATE 5 MG/1
TABLET ORAL
Qty: 90 TABLET | Status: ACTIVE | COMMUNITY

## 2025-06-20 RX ORDER — HYDROCHLOROTHIAZIDE 25 MG/1
TAKE 1 TABLET BY MOUTH ONCE DAILY TABLET ORAL
Qty: 90 EACH | Refills: 2 | Status: ACTIVE | COMMUNITY

## 2025-06-20 RX ORDER — OMEPRAZOLE 20 MG/1
CAPSULE, DELAYED RELEASE ORAL
Qty: 90 CAPSULE | Status: ACTIVE | COMMUNITY

## 2025-06-20 RX ORDER — LOSARTAN POTASSIUM 100 MG/1
TABLET, FILM COATED ORAL
Qty: 90 TABLET | Status: ACTIVE | COMMUNITY

## 2025-06-20 RX ORDER — HYDROCORTISONE 25 MG/G
APPLY TOPICALLY TO AFFECTED AREA TWICE DAILY CREAM TOPICAL
Qty: 28 GRAM | Refills: 0 | Status: ACTIVE | COMMUNITY

## 2025-06-20 NOTE — HPI-TODAY'S VISIT:
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  former pt of dr michele last ov 8/16/2023.  was on omeprazole 40mg qd w good control of gerd. but, rarely feels gerd.  dec to 20mg and still wo c/os still having 2-3 bms/d does have probs w hemorrhoids occ. likes the proctozone. occ bld. still does not want to pursue RBL at this point labs done in response to the notation of gastric IM revealed prob PA. now on B12 inj qmo and oral qwk had an US ordered at 5/3/2023 for a  hx of hepatic cyst which showed benign cysts and a 2.2 cm stone in the GB neck. referred to a GS. but, developed acute cholecystitis prompting a LC 6/1/2023. labs then also showed a microcytic anemia and ALT/AST 56/96.  now seeing dr neeta mejia her ABIGAIL felt 2/2 malabsorption. on po iron.  12/5/24: Ms. Kinsey returns to GI clinic for follow-up of rectal bleeding.  Bleeding has stopped.  She wonders about hemorrhoids.  She did have a sister with colon cancer and asks about repeating this.  She is interested in hemorrhoid banding if she is able.  1/21/25: Ms. Kinsey returns for follow-up of hemorrhoid bleeding.  She continues to have rectal bleeding frequently from her hemorrhoids.  No constipation or straining.  2/18/25: Ms. Kinsey returns  for follow-up of hemorrhoid bleeding.  Bleeding has improved a lot.  She had two episodes right after her banding but none in the past few weeks.  She is planning her colonoscopy March 3rd.  6/20/25: Ms. Kinsey returns for follow-up of rectal bleeding due to hemorrhoids as well as iron deficiency anemia.  She had a colonoscopy March 2025 with a small TA removed from her ascending and some hemorrhoids with hemorrhoid banding scarring noted.  Today she reports that she continues to spot blood and has to wear a pad for this.  She also is still iron deficient and is on an iron pill.  She is frustrated with her continued rectal bleeding.

## 2025-06-25 ENCOUNTER — OFFICE VISIT (OUTPATIENT)
Dept: URBAN - NONMETROPOLITAN AREA CLINIC 2 | Facility: CLINIC | Age: 85
End: 2025-06-25